# Patient Record
Sex: FEMALE | URBAN - METROPOLITAN AREA
[De-identification: names, ages, dates, MRNs, and addresses within clinical notes are randomized per-mention and may not be internally consistent; named-entity substitution may affect disease eponyms.]

---

## 2017-10-22 ENCOUNTER — EMERGENCY (EMERGENCY)
Facility: HOSPITAL | Age: 57
LOS: 1 days | Discharge: HOME | End: 2017-10-22
Attending: EMERGENCY MEDICINE | Admitting: EMERGENCY MEDICINE
Payer: COMMERCIAL

## 2017-10-22 VITALS — TEMPERATURE: 98 F | HEART RATE: 80 BPM | OXYGEN SATURATION: 100 % | RESPIRATION RATE: 18 BRPM

## 2017-10-22 VITALS
RESPIRATION RATE: 16 BRPM | OXYGEN SATURATION: 100 % | HEART RATE: 65 BPM | SYSTOLIC BLOOD PRESSURE: 121 MMHG | DIASTOLIC BLOOD PRESSURE: 66 MMHG

## 2017-10-22 LAB
ALBUMIN SERPL ELPH-MCNC: 4 G/DL — SIGNIFICANT CHANGE UP (ref 3.3–5)
ALP SERPL-CCNC: 79 U/L — SIGNIFICANT CHANGE UP (ref 40–120)
ALT FLD-CCNC: 18 U/L — SIGNIFICANT CHANGE UP (ref 4–33)
AST SERPL-CCNC: 21 U/L — SIGNIFICANT CHANGE UP (ref 4–32)
BASOPHILS # BLD AUTO: 0.02 K/UL — SIGNIFICANT CHANGE UP (ref 0–0.2)
BASOPHILS NFR BLD AUTO: 0.4 % — SIGNIFICANT CHANGE UP (ref 0–2)
BILIRUB SERPL-MCNC: 0.4 MG/DL — SIGNIFICANT CHANGE UP (ref 0.2–1.2)
BUN SERPL-MCNC: 16 MG/DL — SIGNIFICANT CHANGE UP (ref 7–23)
CALCIUM SERPL-MCNC: 8.2 MG/DL — LOW (ref 8.4–10.5)
CHLORIDE SERPL-SCNC: 108 MMOL/L — HIGH (ref 98–107)
CK SERPL-CCNC: 223 U/L — HIGH (ref 25–170)
CO2 SERPL-SCNC: 23 MMOL/L — SIGNIFICANT CHANGE UP (ref 22–31)
CREAT SERPL-MCNC: 0.66 MG/DL — SIGNIFICANT CHANGE UP (ref 0.5–1.3)
EOSINOPHIL # BLD AUTO: 0.14 K/UL — SIGNIFICANT CHANGE UP (ref 0–0.5)
EOSINOPHIL NFR BLD AUTO: 2.6 % — SIGNIFICANT CHANGE UP (ref 0–6)
GLUCOSE SERPL-MCNC: 127 MG/DL — HIGH (ref 70–99)
HCT VFR BLD CALC: 33 % — LOW (ref 34.5–45)
HGB BLD-MCNC: 11 G/DL — LOW (ref 11.5–15.5)
IMM GRANULOCYTES # BLD AUTO: 0.01 # — SIGNIFICANT CHANGE UP
IMM GRANULOCYTES NFR BLD AUTO: 0.2 % — SIGNIFICANT CHANGE UP (ref 0–1.5)
LYMPHOCYTES # BLD AUTO: 2.09 K/UL — SIGNIFICANT CHANGE UP (ref 1–3.3)
LYMPHOCYTES # BLD AUTO: 39.4 % — SIGNIFICANT CHANGE UP (ref 13–44)
MAGNESIUM SERPL-MCNC: 2.2 MG/DL — SIGNIFICANT CHANGE UP (ref 1.6–2.6)
MCHC RBC-ENTMCNC: 30.3 PG — SIGNIFICANT CHANGE UP (ref 27–34)
MCHC RBC-ENTMCNC: 33.3 % — SIGNIFICANT CHANGE UP (ref 32–36)
MCV RBC AUTO: 90.9 FL — SIGNIFICANT CHANGE UP (ref 80–100)
MONOCYTES # BLD AUTO: 0.29 K/UL — SIGNIFICANT CHANGE UP (ref 0–0.9)
MONOCYTES NFR BLD AUTO: 5.5 % — SIGNIFICANT CHANGE UP (ref 2–14)
NEUTROPHILS # BLD AUTO: 2.75 K/UL — SIGNIFICANT CHANGE UP (ref 1.8–7.4)
NEUTROPHILS NFR BLD AUTO: 51.9 % — SIGNIFICANT CHANGE UP (ref 43–77)
NRBC # FLD: 0 — SIGNIFICANT CHANGE UP
PHOSPHATE SERPL-MCNC: 2.9 MG/DL — SIGNIFICANT CHANGE UP (ref 2.5–4.5)
PLATELET # BLD AUTO: 190 K/UL — SIGNIFICANT CHANGE UP (ref 150–400)
PMV BLD: 10.5 FL — SIGNIFICANT CHANGE UP (ref 7–13)
POTASSIUM SERPL-MCNC: 4.1 MMOL/L — SIGNIFICANT CHANGE UP (ref 3.5–5.3)
POTASSIUM SERPL-SCNC: 4.1 MMOL/L — SIGNIFICANT CHANGE UP (ref 3.5–5.3)
PROT SERPL-MCNC: 6.3 G/DL — SIGNIFICANT CHANGE UP (ref 6–8.3)
RBC # BLD: 3.63 M/UL — LOW (ref 3.8–5.2)
RBC # FLD: 14.4 % — SIGNIFICANT CHANGE UP (ref 10.3–14.5)
SODIUM SERPL-SCNC: 143 MMOL/L — SIGNIFICANT CHANGE UP (ref 135–145)
TSH SERPL-MCNC: 1.74 UIU/ML — SIGNIFICANT CHANGE UP (ref 0.27–4.2)
WBC # BLD: 5.3 K/UL — SIGNIFICANT CHANGE UP (ref 3.8–10.5)
WBC # FLD AUTO: 5.3 K/UL — SIGNIFICANT CHANGE UP (ref 3.8–10.5)

## 2017-10-22 PROCEDURE — 93010 ELECTROCARDIOGRAM REPORT: CPT | Mod: 59

## 2017-10-22 PROCEDURE — 73030 X-RAY EXAM OF SHOULDER: CPT | Mod: 26,RT

## 2017-10-22 PROCEDURE — 99285 EMERGENCY DEPT VISIT HI MDM: CPT | Mod: 25

## 2017-10-22 RX ORDER — ACETAMINOPHEN 500 MG
650 TABLET ORAL ONCE
Qty: 0 | Refills: 0 | Status: COMPLETED | OUTPATIENT
Start: 2017-10-22 | End: 2017-10-22

## 2017-10-22 RX ADMIN — Medication 650 MILLIGRAM(S): at 16:49

## 2017-10-22 NOTE — ED PROVIDER NOTE - ATTENDING CONTRIBUTION TO CARE
Attending Statement: I have personally seen and examined this patient. I have fully participated in the care of this patient. I have reviewed all pertinent clinical information, including history physical exam, plan and the Resident's note and agree except as noted  58yo F pw pain of the right arm, states "I have muscle pain" for "months" between 2-3months has had intermittent right arm pain. no hx of trauma. no weakness or numbness. no swelling. no sob/cp. Has had chronic "muscle pain" for years taking lyrica for 8 years for same pain. Attending Statement: I have personally seen and examined this patient. I have fully participated in the care of this patient. I have reviewed all pertinent clinical information, including history physical exam, plan and the Resident's note and agree except as noted  58yo F pw pain of the right arm, states "I have muscle pain" for "months" between 2-3months has had intermittent right arm pain. no hx of trauma. no weakness or numbness. no swelling. no sob/cp. Has had chronic "muscle pain" for years taking lyrica for 8 years for same pain.  no fever or chills. no change in wt. Attending Statement: I have personally seen and examined this patient. I have fully participated in the care of this patient. I have reviewed all pertinent clinical information, including history physical exam, plan and the Resident's note and agree except as noted  56yo F pw pain of the right arm, states "I have muscle pain" for "months" between 2-3months has had intermittent right arm pain. no hx of trauma. no weakness or numbness. no swelling. no sob/cp. Has had chronic "muscle pain" for years taking lyrica for 8 years for same pain.  no fever or chills. no change in wt.   Vital signs noted. laying in bed . no distress. not tachycardic not tachypneic normal S1-S2 No resp distress. able to speak in full and clear sentences. no wheeze, rales or stridor. soft nt abdomen. no pedal edema. no calf tenderness. normal pulses bilateral feet. nontender right arm. no swelling. nl rom of the right shoulder and right elbow. nl  bl hands. nl radial/ulnar pulse. aox3 bl feet toe no swelling no erythema.   plan labs, ekg,  and re assess

## 2017-10-22 NOTE — ED PROVIDER NOTE - OBJECTIVE STATEMENT
57F h/o chronic back pain p/w R shoulder pain x 2 months, worse x 1 week. Pt states the pain is in the muscle overlying the lateral shoulder. No trauma, no joint pain, full ROM of R shoulder. Pt on lyrica x years for intermittent LE pain, which pt states is unrelated to chronic back pain x 30 years and also localizes to the muscle. The back pain is lumbar paraspinal, unchanged, no difficulty urinating or dysuria. No fever, weight loss, numbness, weakness, CP, SOB, cough, skin changes, or joint pain. Pt also c/o pain from ingrown toenails x months, no redness/swelling/drainage. 57F h/o chronic back pain p/w R shoulder pain x 2 months, worse x 1 week. Pt states the pain is in the muscle overlying the lateral shoulder. No trauma, no joint pain, full ROM of R shoulder. Pt on lyrica x years for intermittent LE muscle pain, which pt states is unrelated to chronic back pain x 30 years. The back pain is lumbar paraspinal, unchanged, no difficulty urinating or dysuria. No fever, weight loss, numbness, weakness, CP, SOB, cough, skin changes, or joint pain. Pt also c/o pain from ingrown toenails x months, no redness/swelling/drainage.

## 2017-10-22 NOTE — ED PROVIDER NOTE - PROGRESS NOTE DETAILS
Gollogly: pt feels better pain improved. CK slightly elevated, no CP, EKG nml, not consistent with rhabdo, ACS, or myositis. Discussed results and follow up plan, including podiatry and rheumatology. Gave return precautions. Gollogly: pt feels better pain improved. No fx/dislocation seen on shoulder xray. CK slightly elevated, no CP, EKG nml, not consistent with rhabdo, ACS, or myositis. Discussed results and follow up plan, including podiatry and rheumatology. Gave return precautions.

## 2017-10-22 NOTE — ED PROVIDER NOTE - MEDICAL DECISION MAKING DETAILS
57F with R shoulder pain. Ddx: ?myositis, hypothyroidism, electrolyte abnormality, referred pain from shoulder (OA, fx, dislocation). Plan: labs, pain meds, xray shoulder, reassess. If normal will d/c with rheum follow up for further eval and podiatry referral for foot pain.

## 2017-10-22 NOTE — ED PROVIDER NOTE - PLAN OF CARE
-- See referral list to follow up with rheumatology within 1 week. Bring a copy of your results from today and review them with your doctor.   -- See referral list to make an appointment with podiatry (foot specialist) within 1 week.  -- Drink plenty of fluids. Take motrin (also known as ibuprofen or aleve) 400mg every 6 hours as needed for pain. Take motrin with meals. Do not exceed 2400mg of motrin in 24 hours. Or you may take tylenol 650mg every 6 hours for pain. Do not take more than 2600mg of tylenol in 24 hours. Tylenol and motrin do not interact and are safe to use together.   -- Return to ER immediately for new or worsening symptoms, any urgent issues, or for any concerns.

## 2017-10-22 NOTE — ED PROVIDER NOTE - CARE PLAN
Principal Discharge DX:	Muscle pain  Instructions for follow-up, activity and diet:	-- See referral list to follow up with rheumatology within 1 week. Bring a copy of your results from today and review them with your doctor.   -- See referral list to make an appointment with podiatry (foot specialist) within 1 week.  -- Drink plenty of fluids. Take motrin (also known as ibuprofen or aleve) 400mg every 6 hours as needed for pain. Take motrin with meals. Do not exceed 2400mg of motrin in 24 hours. Or you may take tylenol 650mg every 6 hours for pain. Do not take more than 2600mg of tylenol in 24 hours. Tylenol and motrin do not interact and are safe to use together.   -- Return to ER immediately for new or worsening symptoms, any urgent issues, or for any concerns.

## 2025-08-15 ENCOUNTER — EMERGENCY (EMERGENCY)
Facility: HOSPITAL | Age: 65
LOS: 0 days | Discharge: HOME | End: 2025-08-16
Attending: EMERGENCY MEDICINE
Payer: MEDICAID

## 2025-08-15 VITALS
SYSTOLIC BLOOD PRESSURE: 144 MMHG | TEMPERATURE: 98 F | RESPIRATION RATE: 18 BRPM | DIASTOLIC BLOOD PRESSURE: 88 MMHG | OXYGEN SATURATION: 100 % | HEART RATE: 72 BPM

## 2025-08-15 DIAGNOSIS — M79.674 PAIN IN RIGHT TOE(S): ICD-10-CM

## 2025-08-15 DIAGNOSIS — Z88.5 ALLERGY STATUS TO NARCOTIC AGENT: ICD-10-CM

## 2025-08-15 PROCEDURE — 99284 EMERGENCY DEPT VISIT MOD MDM: CPT | Mod: 25

## 2025-08-15 PROCEDURE — 99283 EMERGENCY DEPT VISIT LOW MDM: CPT

## 2025-08-15 RX ORDER — OXYCODONE HYDROCHLORIDE 30 MG/1
5 TABLET ORAL ONCE
Refills: 0 | Status: DISCONTINUED | OUTPATIENT
Start: 2025-08-15 | End: 2025-08-15

## 2025-08-15 RX ORDER — OXYCODONE HYDROCHLORIDE 30 MG/1
1 TABLET ORAL
Qty: 12 | Refills: 0
Start: 2025-08-15 | End: 2025-08-17

## 2025-08-16 PROBLEM — M54.9 DORSALGIA, UNSPECIFIED: Chronic | Status: ACTIVE | Noted: 2017-10-22

## 2025-08-16 RX ADMIN — OXYCODONE HYDROCHLORIDE 5 MILLIGRAM(S): 30 TABLET ORAL at 00:01
